# Patient Record
Sex: FEMALE | Race: WHITE | Employment: FULL TIME | URBAN - METROPOLITAN AREA
[De-identification: names, ages, dates, MRNs, and addresses within clinical notes are randomized per-mention and may not be internally consistent; named-entity substitution may affect disease eponyms.]

---

## 2019-10-02 ENCOUNTER — HOSPITAL ENCOUNTER (EMERGENCY)
Age: 34
Discharge: HOME | End: 2019-10-02
Payer: COMMERCIAL

## 2019-10-02 VITALS
WEIGHT: 165 LBS | DIASTOLIC BLOOD PRESSURE: 84 MMHG | RESPIRATION RATE: 16 BRPM | BODY MASS INDEX: 25.01 KG/M2 | HEART RATE: 64 BPM | SYSTOLIC BLOOD PRESSURE: 143 MMHG | TEMPERATURE: 97.6 F | OXYGEN SATURATION: 100 % | HEIGHT: 68 IN

## 2019-10-02 DIAGNOSIS — S61.311A LACERATION OF LEFT INDEX FINGER WITHOUT FOREIGN BODY WITH DAMAGE TO NAIL, INITIAL ENCOUNTER: Primary | ICD-10-CM

## 2019-10-02 PROCEDURE — 99283 EMERGENCY DEPT VISIT LOW MDM: CPT

## 2019-10-02 PROCEDURE — 4500000023 HC ED LEVEL 3 PROCEDURE

## 2019-10-02 ASSESSMENT — PAIN SCALES - GENERAL: PAINLEVEL_OUTOF10: 3

## 2019-10-02 ASSESSMENT — PAIN DESCRIPTION - LOCATION: LOCATION: HAND

## 2019-10-02 ASSESSMENT — PAIN DESCRIPTION - ORIENTATION: ORIENTATION: LEFT

## 2019-10-02 ASSESSMENT — PAIN DESCRIPTION - PAIN TYPE: TYPE: ACUTE PAIN

## 2020-11-05 ENCOUNTER — OFFICE VISIT (OUTPATIENT)
Dept: ORTHOPEDIC SURGERY | Age: 35
End: 2020-11-05
Payer: COMMERCIAL

## 2020-11-05 VITALS — BODY MASS INDEX: 25.01 KG/M2 | RESPIRATION RATE: 15 BRPM | HEIGHT: 68 IN | WEIGHT: 165 LBS

## 2020-11-05 PROBLEM — S61.459A DOG BITE OF HAND WITHOUT COMPLICATION, INITIAL ENCOUNTER: Status: ACTIVE | Noted: 2020-11-05
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
## 2020-11-05 PROCEDURE — 99203 OFFICE O/P NEW LOW 30 MIN: CPT | Performed by: ORTHOPAEDIC SURGERY

## 2020-11-05 RX ORDER — AMOXICILLIN AND CLAVULANATE POTASSIUM 875; 125 MG/1; MG/1
TABLET, FILM COATED ORAL
COMMUNITY
Start: 2020-10-17

## 2020-11-05 RX ORDER — BIOTIN 800 MCG
TABLET ORAL
COMMUNITY
Start: 2020-10-23

## 2023-04-17 NOTE — PROGRESS NOTES
additional worrisome rashes, ulcerations or lesions. Gait: normal    Circulation: well perfused    Additional Comments:     Additional Examinations:  Left Upper Extremity: Examination of the left upper extremity does not show any tenderness, deformity or injury. Range of motion is unremarkable. There is no gross instability. There are no rashes, ulcerations or lesions. Strength and tone are normal.       Radiology:     X-rays obtained and reviewed in office:  Views 3  Location right hand  Impression Three views of the right hand reveal no sign of fracture and satisfactory articular congruity. Assessment: Right hand dog bite with residual stiffness    Impression:   Encounter Diagnoses   Name Primary?  Right hand pain Yes    Dog bite of hand without complication, initial encounter        Office Procedures:  Orders Placed This Encounter   Procedures    XR HAND RIGHT (MIN 3 VIEWS)     Standing Status:   Future     Number of Occurrences:   1     Standing Expiration Date:   11/5/2021       Treatment Plan: I reassured the patient that I believe that structurally her hand remains intact. I see no convincing signs of critical bone or neurovascular or tendon injury. I offered her some outpatient physical therapy but ultimately I also offered her reassurance that this should resolve nicely with time. She feels comfortable letting comfort be her guide to advancing her activities. I will be happy to see her back on an as-needed basis should she feel any setback. Please note that this transcription was created using voice recognition software. Any errors are unintentional and may be due to voice recognition transcription. (1) Female